# Patient Record
Sex: MALE | Race: WHITE
[De-identification: names, ages, dates, MRNs, and addresses within clinical notes are randomized per-mention and may not be internally consistent; named-entity substitution may affect disease eponyms.]

---

## 2018-06-01 ENCOUNTER — HOSPITAL ENCOUNTER (EMERGENCY)
Dept: HOSPITAL 76 - ED | Age: 38
Discharge: HOME | End: 2018-06-01
Payer: COMMERCIAL

## 2018-06-01 VITALS — DIASTOLIC BLOOD PRESSURE: 93 MMHG | SYSTOLIC BLOOD PRESSURE: 127 MMHG

## 2018-06-01 DIAGNOSIS — R42: Primary | ICD-10-CM

## 2018-06-01 DIAGNOSIS — R07.9: ICD-10-CM

## 2018-06-01 LAB
ALBUMIN DIAFP-MCNC: 4.9 G/DL (ref 3.2–5.5)
ALBUMIN/GLOB SERPL: 1.4 {RATIO} (ref 1–2.2)
ALP SERPL-CCNC: 67 IU/L (ref 42–121)
ALT SERPL W P-5'-P-CCNC: 23 IU/L (ref 10–60)
ANION GAP SERPL CALCULATED.4IONS-SCNC: 9 MMOL/L (ref 6–13)
AST SERPL W P-5'-P-CCNC: 25 IU/L (ref 10–42)
BASOPHILS NFR BLD AUTO: 0 10^3/UL (ref 0–0.1)
BASOPHILS NFR BLD AUTO: 0.5 %
BILIRUB BLD-MCNC: 1.2 MG/DL (ref 0.2–1)
BUN SERPL-MCNC: 14 MG/DL (ref 6–20)
CALCIUM UR-MCNC: 10 MG/DL (ref 8.5–10.3)
CHLORIDE SERPL-SCNC: 98 MMOL/L (ref 101–111)
CO2 SERPL-SCNC: 29 MMOL/L (ref 21–32)
CREAT SERPLBLD-SCNC: 0.9 MG/DL (ref 0.6–1.2)
EOSINOPHIL # BLD AUTO: 0.1 10^3/UL (ref 0–0.7)
EOSINOPHIL NFR BLD AUTO: 2.1 %
ERYTHROCYTE [DISTWIDTH] IN BLOOD BY AUTOMATED COUNT: 13.1 % (ref 12–15)
GFRSERPLBLD MDRD-ARVRAT: 94 ML/MIN/{1.73_M2} (ref 89–?)
GLOBULIN SER-MCNC: 3.5 G/DL (ref 2.1–4.2)
GLUCOSE SERPL-MCNC: 103 MG/DL (ref 70–100)
HGB UR QL STRIP: 14 G/DL (ref 14–18)
LIPASE SERPL-CCNC: 35 U/L (ref 22–51)
LYMPHOCYTES # SPEC AUTO: 2.5 10^3/UL (ref 1.5–3.5)
LYMPHOCYTES NFR BLD AUTO: 41.8 %
MAGNESIUM SERPL-MCNC: 2.1 MG/DL (ref 1.7–2.8)
MCH RBC QN AUTO: 30 PG (ref 27–31)
MCHC RBC AUTO-ENTMCNC: 34.4 G/DL (ref 32–36)
MCV RBC AUTO: 87.3 FL (ref 80–94)
MONOCYTES # BLD AUTO: 0.4 10^3/UL (ref 0–1)
MONOCYTES NFR BLD AUTO: 7.4 %
NEUTROPHILS # BLD AUTO: 2.9 10^3/UL (ref 1.5–6.6)
NEUTROPHILS # SNV AUTO: 5.9 X10^3/UL (ref 4.8–10.8)
NEUTROPHILS NFR BLD AUTO: 48.2 %
PDW BLD AUTO: 8.8 FL (ref 7.4–11.4)
PHOSPHATE BLD-MCNC: 3.4 MG/DL (ref 2.5–4.6)
PLATELET # BLD: 267 10^3/UL (ref 130–450)
PROT SPEC-MCNC: 8.4 G/DL (ref 6.7–8.2)
RBC MAR: 4.67 10^6/UL (ref 4.7–6.1)
SODIUM SERPLBLD-SCNC: 136 MMOL/L (ref 135–145)

## 2018-06-01 PROCEDURE — 85025 COMPLETE CBC W/AUTO DIFF WBC: CPT

## 2018-06-01 PROCEDURE — 83690 ASSAY OF LIPASE: CPT

## 2018-06-01 PROCEDURE — 71045 X-RAY EXAM CHEST 1 VIEW: CPT

## 2018-06-01 PROCEDURE — 80053 COMPREHEN METABOLIC PANEL: CPT

## 2018-06-01 PROCEDURE — 99284 EMERGENCY DEPT VISIT MOD MDM: CPT

## 2018-06-01 PROCEDURE — 83735 ASSAY OF MAGNESIUM: CPT

## 2018-06-01 PROCEDURE — 36415 COLL VENOUS BLD VENIPUNCTURE: CPT

## 2018-06-01 PROCEDURE — 84100 ASSAY OF PHOSPHORUS: CPT

## 2018-06-01 PROCEDURE — 84484 ASSAY OF TROPONIN QUANT: CPT

## 2018-06-01 PROCEDURE — 93005 ELECTROCARDIOGRAM TRACING: CPT

## 2018-06-01 NOTE — ED PHYSICIAN DOCUMENTATION
History of Present Illness





- Stated complaint


Stated Complaint: LIGHTHEADED





- Chief complaint


Chief Complaint: Neuro





- History obtained from


History obtained from: Patient, Family





- History of Present Illness


Timing: Today


Pain level max: 3


Pain level now: 0


Improved by: time


Worsened by: nothing





- Additonal information


Additional information: 





Patient is a 37-year-old male who presents to the emergency department with left

-sided pain, palpitations and lightheadedness today.  This occurred while 

driving.  Currently is feeling better.  States that he has heartburn nearly 

daily.  States has had H. pylori in the past. Patient states that the pain has 

been there constantly for several days to weeks.  No recent travel or plane 

rides.  No history of immobilization or surgery.  No history of blood clots in 

the family.  No young cardiac disease Patient does drink several cups of coffee 

a day.





Review of Systems


Ten Systems: 10 systems reviewed and negative


Constitutional: denies: Fever, Chills


Eyes: denies: Decreased vision, Photophobia


Ears: denies: Ear pain


Nose: denies: Rhinorrhea / runny nose, Congestion


Respiratory: denies: Cough


GI: denies: Abdominal Pain, Nausea, Vomiting, Diarrhea


Skin: denies: Rash


Musculoskeletal: denies: Neck pain, Back pain


Neurologic: denies: Focal weakness, Numbness, Confused, Altered mental status, 

Headache





PD PAST MEDICAL HISTORY





- Past Medical History


Past Medical History: Yes


GI: GERD (h. pylori)





- Past Surgical History


Past Surgical History: No





- Present Medications


Home Medications: 


 Ambulatory Orders











 Medication  Instructions  Recorded  Confirmed


 


Famotidine [Pepcid] 20 mg PO BID #60 tablet 06/01/18 


 


Omeprazole [PriLOSEC] 20 mg PO DAILY #30 capsule 06/01/18 














- Allergies


Allergies/Adverse Reactions: 


 Allergies











Allergy/AdvReac Type Severity Reaction Status Date / Time


 


No Known Drug Allergies Allergy   Verified 06/01/18 18:27














- Living Situation


Living Situation: reports: With family


Living Arrangement: reports: At home





- Social History


Does the pt smoke?: No


Does the pt drink ETOH?: Yes


Does the pt have substance abuse?: No





- Family History


Family history: reports: Non contributory





PD ED PE NORMAL





- Vitals


Vital signs reviewed: Yes





- General


General: Alert and oriented X 3, No acute distress, Well developed/nourished





- HEENT


HEENT: Moist mucous membranes, Pharynx benign





- Neck


Neck: Supple, no meningeal sign, No JVD, No bruit





- Cardiac


Cardiac: RRR, Strong equal pulses





- Respiratory


Respiratory: No respiratory distress, Clear bilaterally





- Abdomen


Abdomen: Soft, Non tender, Non distended





- Back


Back: No spinal TTP





- Derm


Derm: Warm and dry





- Extremities


Extremities: No edema, No calf tenderness / cord





- Neuro


Neuro: Alert and oriented X 3





- Psych


Psych: Normal mood, Normal affect





Results





- Vitals


Vitals: 


 Vital Signs - 24 hr











  06/01/18 06/01/18 06/01/18





  18:25 18:47 20:03


 


Temperature 36.4 C L  


 


Heart Rate 92 92 78


 


Respiratory 18 18 20





Rate   


 


Blood Pressure 185/100 H 167/102 H 


 


O2 Saturation 100 100 100














  06/01/18





  21:48


 


Temperature 36.6 C


 


Heart Rate 72


 


Respiratory 16





Rate 


 


Blood Pressure 127/93 H


 


O2 Saturation 99








 Oxygen











O2 Source                      Room air

















- EKG (time done)


  ** 1834


Rate: Rate (enter#) (90)


Rhythm: NSR


Axis: Normal


Intervals: Normal MT


QRS: Normal


Ischemia: Normal ST segments





- Labs


Labs: 


 Laboratory Tests











  06/01/18 06/01/18 06/01/18





  18:45 18:45 18:45


 


WBC  5.9  


 


RBC  4.67 L  


 


Hgb  14.0  


 


Hct  40.8 L  


 


MCV  87.3  


 


MCH  30.0  


 


MCHC  34.4  


 


RDW  13.1  


 


Plt Count  267  


 


MPV  8.8  


 


Neut # (Auto)  2.9  


 


Lymph # (Auto)  2.5  


 


Mono # (Auto)  0.4  


 


Eos # (Auto)  0.1  


 


Baso # (Auto)  0.0  


 


Absolute Nucleated RBC  0.00  


 


Nucleated RBC %  0.0  


 


Sodium   136 


 


Potassium   3.5 


 


Chloride   98 L 


 


Carbon Dioxide   29 


 


Anion Gap   9.0 


 


BUN   14 


 


Creatinine   0.9 


 


Estimated GFR (MDRD)   94 


 


Glucose   103 H 


 


Calcium   10.0 


 


Phosphorus   3.4 


 


Magnesium   2.1 


 


Total Bilirubin   1.2 H 


 


AST   25 


 


ALT   23 


 


Alkaline Phosphatase   67 


 


Troponin I    < 0.04


 


Total Protein   8.4 H 


 


Albumin   4.9 


 


Globulin   3.5 


 


Albumin/Globulin Ratio   1.4 


 


Lipase   35 














  06/01/18 06/01/18





  18:45 21:05


 


WBC  


 


RBC  


 


Hgb  


 


Hct  


 


MCV  


 


MCH  


 


MCHC  


 


RDW  


 


Plt Count  


 


MPV  


 


Neut # (Auto)  


 


Lymph # (Auto)  


 


Mono # (Auto)  


 


Eos # (Auto)  


 


Baso # (Auto)  


 


Absolute Nucleated RBC  


 


Nucleated RBC %  


 


Sodium  


 


Potassium  


 


Chloride  


 


Carbon Dioxide  


 


Anion Gap  


 


BUN  


 


Creatinine  


 


Estimated GFR (MDRD)  


 


Glucose  


 


Calcium  


 


Phosphorus  


 


Magnesium  


 


Total Bilirubin  


 


AST  


 


ALT  


 


Alkaline Phosphatase  


 


Troponin I  Cancelled  < 0.04


 


Total Protein  


 


Albumin  


 


Globulin  


 


Albumin/Globulin Ratio  


 


Lipase  














- Rads (name of study)


  ** cxr


Radiology: Prelim report reviewed, EMP read contemporaneously, See rad report (

normal)





PD MEDICAL DECISION MAKING





- ED course


Complexity details: reviewed results, re-evaluated patient, considered 

differential (No ST elevation MI, no aortic dissection, no PE, no tension 

pneumothorax, no aortic aneurysm), d/w patient, d/w family


ED course: 





Patient is a 37-year-old male who presents to the emergency department with 

constant left-sided chest pain for the past several days.  Unclear etiology. No 

acute findings on telemetry.  No acute findings on EKG, chest x-ray or 

laboratory testing.  He did feel better after GI cocktail, will trial him on a 

PPI and H2 blocker.  We will also have him decrease his caffeine intake.  

Patient and family counseled regarding signs and symptoms for which I believe 

and urgent re-evaluation would be necessary. Patient with good understanding of 

and agreement to plan and is comfortable going home at this time





This document was made in part using voice recognition software. While efforts 

are made to proofread this document, sound alike and grammatical errors may 

occur.





Departure





- Departure


Disposition: 01 Home, Self Care


Clinical Impression: 


 Light-headed feeling





Chest pain


Qualifiers:


 Chest pain type: unspecified Qualified Code(s): R07.9 - Chest pain, unspecified





Condition: Good


Instructions:  ED Chest Pain Atypical Unkn Cause


Follow-Up: 


TAISHA SIMON [Primary Care Provider] - Within 1 week


Prescriptions: 


Famotidine [Pepcid] 20 mg PO BID #60 tablet


Omeprazole [PriLOSEC] 20 mg PO DAILY #30 capsule


Comments: 


The cause of your symptoms is unclear today.  Your laboratory testing is 

normal.  Return if you worsen.  It is recommended that you decrease your 

caffeine intake.


Discharge Date/Time: 06/01/18 21:51

## 2018-06-01 NOTE — XRAY REPORT
EXAM: 

CHEST RADIOGRAPHY

 

EXAM DATE: 6/1/2018 07:07 PM.

 

CLINICAL HISTORY: Chest pain.

 

COMPARISON: None.

 

TECHNIQUE: 1 view.

 

FINDINGS:

Lungs/Pleura: No focal opacities evident. No pleural effusion. No pneumothorax.

 

Mediastinum: Within exam limitations, the cardiomediastinal contour is normal.

 

Other: None.

 

IMPRESSION: Normal single view chest.

 

[facility]

Referring Provider Line: [phone]

 

SITE ID: [id number]

## 2021-04-19 ENCOUNTER — HOSPITAL ENCOUNTER (OUTPATIENT)
Dept: HOSPITAL 76 - DI.S | Age: 41
Discharge: HOME | End: 2021-04-19
Attending: PHYSICIAN ASSISTANT
Payer: COMMERCIAL

## 2021-04-19 DIAGNOSIS — R05: Primary | ICD-10-CM

## 2021-04-19 DIAGNOSIS — U07.1: ICD-10-CM

## 2021-04-19 PROCEDURE — 87276 INFLUENZA A AG IF: CPT

## 2021-04-19 PROCEDURE — 87275 INFLUENZA B AG IF: CPT

## 2021-04-19 NOTE — XRAY REPORT
PROCEDURE:  Chest 2 View X-Ray

 

INDICATIONS:  COUGH

 

TECHNIQUE:  2 view(s) of the chest.  

 

COMPARISON:  Single view chest 6/1/2018.

 

FINDINGS:  

 

Surgical changes and devices:  None.  

 

Lungs and pleura:  No pleural effusions or pneumothorax.  Lungs are clear.  

 

Mediastinum:  Mediastinal contours are normal.  Heart size is normal.  

 

Bones and chest wall:  No suspicious bony abnormalities.  Soft tissues appear unremarkable.  

 

IMPRESSION:  Normal for age, source of current symptoms is not seen.

 

Reviewed by: Karlos Gan MD on 4/19/2021 9:31 AM PDT

Approved by: Karlos Gan MD on 4/19/2021 9:31 AM PDT

 

 

Station ID:  [id number]

## 2023-03-24 ENCOUNTER — HOSPITAL ENCOUNTER (OUTPATIENT)
Dept: HOSPITAL 76 - LAB | Age: 43
Discharge: HOME | End: 2023-03-24
Attending: EMERGENCY MEDICINE
Payer: COMMERCIAL

## 2023-03-24 DIAGNOSIS — R39.15: Primary | ICD-10-CM

## 2023-03-24 LAB
CLARITY UR REFRACT.AUTO: CLEAR
GLUCOSE UR QL STRIP.AUTO: NEGATIVE MG/DL
KETONES UR QL STRIP.AUTO: NEGATIVE MG/DL
NITRITE UR QL STRIP.AUTO: NEGATIVE
PH UR STRIP.AUTO: 7 PH (ref 5–7.5)
PROT UR STRIP.AUTO-MCNC: NEGATIVE MG/DL
RBC # UR STRIP.AUTO: NEGATIVE /UL
RBC # URNS HPF: (no result) /HPF (ref 0–5)
SP GR UR STRIP.AUTO: 1.01 (ref 1–1.03)
SQUAMOUS URNS QL MICRO: (no result)
UROBILINOGEN UR QL STRIP.AUTO: (no result) E.U./DL
UROBILINOGEN UR STRIP.AUTO-MCNC: NEGATIVE MG/DL
WBC # UR MANUAL: (no result) /HPF (ref 0–3)

## 2023-03-24 PROCEDURE — 81001 URINALYSIS AUTO W/SCOPE: CPT

## 2023-03-24 PROCEDURE — 87086 URINE CULTURE/COLONY COUNT: CPT
